# Patient Record
(demographics unavailable — no encounter records)

---

## 2025-03-24 NOTE — HISTORY OF PRESENT ILLNESS
[de-identified] : Body part: lower back Better/ Worse/ Same since last visit: worse-states his back has been "seizing up" on him Treatments since last visit: PT 1x every few weeks with a friend who is a physical therapist in [facility] Difficulty with: bending forward, standing, sleeping Radicular symptoms: none Current medications for pain: Advil/Aleve and Extra Strength Tylenol, using Voltaren Gel Assistive walking device: none   Today's Pain:  4/10

## 2025-03-24 NOTE — ASSESSMENT
[FreeTextEntry1] : 60 yo male presents today for eval of his low back pain. XR shows DDD at L5-S1. Patient has undergone more than 6 weeks of PT 1-2 times per week David Sandoval at [facility] in [place] since their last visit as well as performed an HEP as detailed below with some improvement in their symptoms. They have also undergone medication management including but not limited to Advil and Tylenol with temporary relief. Due to persistent pain and radicular symptoms, recommend an MRI to further evaluate for nerve involvement and degree of pathology, likely need for injection.   - Patient given prescription for MRI, follow up after study is completed to discuss results.  Patient has been doing a home exercise plan based on exercises given on their last office visit.  These exercises include calf stretching, hamstring stretching, hip flexor stretching, hip rotator stretch, quad stretching, curl ups, bridges, prone press up, knee lift leg reach and wall slide.  Patient has been doing these exercises for over 6 weeks, roughly 4 times a week for 30 minutes daily.  Patient is still experiencing low back pain with radiculopathy.    - Patient will start HEP as detailed in office. Emphasized daily stretching and strengthening.   - Recommend NSAIDs PRN - Recommend heating pad use to decrease muscle spasm - Discussed the importance of home exercises, including but not limited to hamstring stretching and core strengthening, yoga, etc.   Patient was educated on their diagnosis today. All questions answered and patient expressed understanding.  F/U after MRI

## 2025-05-05 NOTE — DATA REVIEWED
[MRI] : MRI [Lumbar Spine] : lumbar spine [I independently reviewed and interpreted images and report] : I independently reviewed and interpreted images and report [FreeTextEntry1] : 4/2025 MRI of L-Spine was reviewed today and is as follows:  Mild stenosis L3-4 L>R lateral recess stenosis L4-5 Moderate stenosis L4-5 Bilateral facet arthropathy L5-S1 with small right facet cyst

## 2025-05-05 NOTE — HISTORY OF PRESENT ILLNESS
[de-identified] : Body part: lower back Better/ Worse/ Same since last visit: Same  Treatments since last visit: HEP Difficulty with: bending forward, standing, sleeping Radicular symptoms: none Current medications for pain: Advil/Aleve and Extra Strength Tylenol PRN, using Voltaren Gel PRN  Assistive walking device: none   Today's Pain:  4/10

## 2025-05-05 NOTE — ASSESSMENT
[FreeTextEntry1] : 4/2025 MRI of L-Spine was reviewed today and is as follows:  Mild stenosis L3-4 L>R lateral recess stenosis L4-5 Moderate stenosis L4-5 Bilateral facet arthropathy L5-S1 with small right facet cyst  60 yo male presents today for eval of his low back pain. MRI with evidence of stenosis, primarily at L4-5. Patient with symptoms worse on the left. He is a candidate for NITO for relief given persistent symptoms.   - Proceed with bilateral L4-5 transforaminal epidural steroid injection at [facility]    - Patient will start HEP as detailed in office. Emphasized daily stretching and strengthening.   - Recommend NSAIDs PRN - Recommend heating pad use to decrease muscle spasm - Discussed the importance of home exercises, including but not limited to hamstring stretching and core strengthening, yoga, etc.   Patient was educated on their diagnosis today. All questions answered and patient expressed understanding.  Follow up in 2 weeks after injection

## 2025-06-02 NOTE — HISTORY OF PRESENT ILLNESS
[de-identified] : Body part: lower back Better/ Worse/ Same since last visit: same  Treatments since last visit: Insurance denied Transforaminal NITO Bilateral L4-5  Difficulty with: bending forward, standing, sleeping Radicular symptoms: pain radiating to bilateral buttock  Current medications for pain: Advil/Aleve and Extra Strength Tylenol PRN, using Voltaren Gel PRN  Assistive walking device: none   Today's Pain:  3/10

## 2025-06-02 NOTE — ASSESSMENT
[FreeTextEntry1] : 4/2025 MRI of L-Spine was reviewed today and is as follows:  Mild stenosis L3-4 L>R lateral recess stenosis L4-5 Moderate stenosis L4-5 Bilateral facet arthropathy L5-S1 with small right facet cyst  60 yo male presents today for eval of his low back pain. MRI with evidence of stenosis, primarily at L4-5 with symptomatic bilaterally lumbar radiculopathy. Patient with symptoms worse on the left. He is a candidate for NITO for relief given persistent symptoms.   - Proceed with bilateral L4-5 transforaminal epidural steroid injection at [facility]    - Patient will start HEP as detailed in office. Emphasized daily stretching and strengthening.   - Recommend NSAIDs PRN - Recommend heating pad use to decrease muscle spasm - Discussed the importance of home exercises, including but not limited to hamstring stretching and core strengthening, yoga, etc.   Patient was educated on their diagnosis today. All questions answered and patient expressed understanding.  Follow up in 2 weeks after injection